# Patient Record
Sex: FEMALE | Race: WHITE | Employment: FULL TIME | URBAN - METROPOLITAN AREA
[De-identification: names, ages, dates, MRNs, and addresses within clinical notes are randomized per-mention and may not be internally consistent; named-entity substitution may affect disease eponyms.]

---

## 2023-11-16 ENCOUNTER — OFFICE VISIT (OUTPATIENT)
Age: 38
End: 2023-11-16

## 2023-11-16 VITALS
HEART RATE: 66 BPM | WEIGHT: 157 LBS | SYSTOLIC BLOOD PRESSURE: 131 MMHG | HEIGHT: 67 IN | RESPIRATION RATE: 18 BRPM | OXYGEN SATURATION: 98 % | DIASTOLIC BLOOD PRESSURE: 80 MMHG | BODY MASS INDEX: 24.64 KG/M2 | TEMPERATURE: 98.3 F

## 2023-11-16 DIAGNOSIS — B02.9 HERPES ZOSTER WITHOUT COMPLICATION: Primary | ICD-10-CM

## 2023-11-16 RX ORDER — VALACYCLOVIR HYDROCHLORIDE 1 G/1
1000 TABLET, FILM COATED ORAL DAILY
Qty: 5 TABLET | Refills: 0 | Status: SHIPPED | OUTPATIENT
Start: 2023-11-16 | End: 2023-11-21

## 2023-11-16 NOTE — PATIENT INSTRUCTIONS
Thank you for visiting [facility] today. Please follow up with your PCP as needed. If you begin to experience any vesicles (blisters) near your eyes, please go to the ER.

## 2023-11-18 ENCOUNTER — TELEPHONE (OUTPATIENT)
Age: 38
End: 2023-11-18

## 2023-11-18 RX ORDER — PREDNISONE 20 MG/1
20 TABLET ORAL 2 TIMES DAILY
Qty: 10 TABLET | Refills: 0 | Status: SHIPPED | OUTPATIENT
Start: 2023-11-18 | End: 2023-11-23

## 2023-11-18 NOTE — TELEPHONE ENCOUNTER
Pt called, verified by name/. Pt advised that provider has sent in medication as prescribed. Voices understanding with no concerns/questions at this time.    Tracy Lips

## 2023-11-18 NOTE — TELEPHONE ENCOUNTER
Pt was advised per provider to take Ibuprofen for pain, pt states this is not helping. Requesting short dose of steroids if warranted by provider at this time.      Please advise,   Tracy Lips

## 2023-11-24 ASSESSMENT — ENCOUNTER SYMPTOMS
PHOTOPHOBIA: 0
EYE ITCHING: 0
EYE REDNESS: 0
COLOR CHANGE: 1
EYE DISCHARGE: 0
EYE PAIN: 0